# Patient Record
(demographics unavailable — no encounter records)

---

## 2024-10-29 NOTE — HISTORY OF PRESENT ILLNESS
[FreeTextEntry1] : Follow up  [de-identified] : may kevin- psychiatrist  went off duloxetine due to issues with potential contaminents started lexapro but doesnt feel well on it she has follow up scheduled to discuss

## 2024-11-08 NOTE — PHYSICAL EXAM
[No Respiratory Distress] : no respiratory distress  [No Accessory Muscle Use] : no accessory muscle use [Normal] : affect was normal and insight and judgment were intact [de-identified] : scattered course breath sounds bilateral

## 2024-11-08 NOTE — HISTORY OF PRESENT ILLNESS
[FreeTextEntry8] : 72 yo female presents with complaint of cough, she says it feels productive, reports congestion/headache. Symptoms began a week ago. Denies fever, chills, cp, palpitations, sob, nvcd.

## 2024-11-11 NOTE — ASSESSMENT
[FreeTextEntry1] : Acute bronchitis: start doxycycline 100 mg po bid x 7 days, start prednisone 10 mg po bid x 5 days, recommend supportive care, start tylenol prn for fever/pain, recommend increased hydration, call EMS if symptoms worsen or develop sob. Recommend hand hygiene, cover mouth when coughing, wash hands frequently, f/u covid19/viral panel ordered RTC 2 wks
former smoker

## 2024-12-07 NOTE — PLAN
[FreeTextEntry1] : Pt is here for an annual PE.   OBGYN: Pt as given referral to Dr. Janet JAEGER. Pt was given a referral for mammogram because she is post-due.   rheumatology: Pt denies taking Prolia short since the past two years. Pt was given referral to Dr. Hernandes for monitoring bone density health and Dexa Scan (May 2023).   GI pt had an endoscopy. Everything was normal. Pt takes acid reflux medicine to manage sxs. Pt was recommended to follow up with Dr. Acevedo   Pt was given referral to dermatology for cyst on her forehead. Pt was given referral to dr. Pamela Vega.  Pt is active physically and exercise daily. Recommended dietary changes including 90-100g of protein, aerobic exercise and emphasized strength training regimen at least 2x weekly to maintain healthy lean muscle mass and bone density.  reviewed labs: Pt encouraged to take vitamin D3 5,000 daily.  Pt has elevated HLD. Pt was recommended to follow up with cardiologist to monitor sxs.     Lab monitoring drawn in office today w/ CBC, CMP, ESR, CRP. Lab monitoring drawn in office today for high risk medication use, high risk disease monitoring.  Pt is UTD with flu vaccines. Pt counseled on B12 vaccine. I recommend she receives it w/ no reservations. Pt needs a second pneumonia vaccine. I recommend she receives it w/ no reservations. Vaccines were administered safely.   RTO in 6-12 months for f.u.

## 2024-12-07 NOTE — HEALTH RISK ASSESSMENT
[Good] : ~his/her~  mood as  good [No] : No [1 or 2 (0 pts)] : 1 or 2 (0 points) [Never (0 pts)] : Never (0 points) [No falls in past year] : Patient reported no falls in the past year [0] : 2) Feeling down, depressed, or hopeless: Not at all (0) [PHQ-2 Negative - No further assessment needed] : PHQ-2 Negative - No further assessment needed [Audit-CScore] : 0 [[id number]] : 0 [MammogramDate] : 12/2019 [BoneDensityDate] : 05/2023 [ColonoscopyDate] : 07/2007

## 2024-12-07 NOTE — HISTORY OF PRESENT ILLNESS
[de-identified] : 73 year y.o female pt is here today for an annual physical examination. Pt is in a good mood. Pt is currently following up with a cardiologist, OBGYN, endocrinologist. Pt was given referral to dermatologist GI, and OBGYN. Pt is no longer using a walker after her bariatric surgery and remarkable improvements in health. Pt was given a referral for mammogram. Pt sxs are currently stable. No active complaints today. UTD with immunization. Pt received a flu shot in the office today.

## 2024-12-07 NOTE — HISTORY OF PRESENT ILLNESS
[de-identified] : 73 year y.o female pt is here today for an annual physical examination. Pt is in a good mood. Pt is currently following up with a cardiologist, OBGYN, endocrinologist. Pt was given referral to dermatologist GI, and OBGYN. Pt is no longer using a walker after her bariatric surgery and remarkable improvements in health. Pt was given a referral for mammogram. Pt sxs are currently stable. No active complaints today. UTD with immunization. Pt received a flu shot in the office today.

## 2024-12-07 NOTE — REASON FOR VISIT
[Annual Wellness Visit] : an annual wellness visit [FreeTextEntry1] : [facility] wellness exam

## 2024-12-07 NOTE — ADDENDUM
[FreeTextEntry1] :    IMerlinghar wrote this note acting as a scribe for Dr. Kristel Muñoz MD on Dec 02, 2024 .   I, Dr. Kristel Muñoz MD, ordering physician, have read and attest that all the information, medical decision making and discharge instructions within are true and accurate on 12/02/2024.

## 2024-12-07 NOTE — HEALTH RISK ASSESSMENT
[Good] : ~his/her~  mood as  good [No] : No [1 or 2 (0 pts)] : 1 or 2 (0 points) [Never (0 pts)] : Never (0 points) [No falls in past year] : Patient reported no falls in the past year [0] : 2) Feeling down, depressed, or hopeless: Not at all (0) [PHQ-2 Negative - No further assessment needed] : PHQ-2 Negative - No further assessment needed [Audit-CScore] : 0 [OYP4Eltss] : 0 [MammogramDate] : 12/2019 [BoneDensityDate] : 05/2023 [ColonoscopyDate] : 07/2007

## 2024-12-07 NOTE — PLAN
Statement Selected [FreeTextEntry1] : Pt is here for an annual PE.   OBGYN: Pt as given referral to Dr. Janet JAEGER. Pt was given a referral for mammogram because she is post-due.   rheumatology: Pt denies taking Prolia short since the past two years. Pt was given referral to Dr. Hernandes for monitoring bone density health and Dexa Scan (May 2023).   GI pt had an endoscopy. Everything was normal. Pt takes acid reflux medicine to manage sxs. Pt was recommended to follow up with Dr. Acevedo   Pt was given referral to dermatology for cyst on her forehead. Pt was given referral to dr. Pamela Vega.  Pt is active physically and exercise daily. Recommended dietary changes including 90-100g of protein, aerobic exercise and emphasized strength training regimen at least 2x weekly to maintain healthy lean muscle mass and bone density.  reviewed labs: Pt encouraged to take vitamin D3 5,000 daily.  Pt has elevated HLD. Pt was recommended to follow up with cardiologist to monitor sxs.     Lab monitoring drawn in office today w/ CBC, CMP, ESR, CRP. Lab monitoring drawn in office today for high risk medication use, high risk disease monitoring.  Pt is UTD with flu vaccines. Pt counseled on B12 vaccine. I recommend she receives it w/ no reservations. Pt needs a second pneumonia vaccine. I recommend she receives it w/ no reservations. Vaccines were administered safely.   RTO in 6-12 months for f.u.

## 2024-12-07 NOTE — REASON FOR VISIT
[Annual Wellness Visit] : an annual wellness visit [FreeTextEntry1] : [facility] wellness exam

## 2025-03-03 NOTE — PHYSICAL EXAM
[Alert] : alert [Well Nourished] : well nourished [Obese] : obese [No Acute Distress] : no acute distress [Well Developed] : well developed [Normal Sclera/Conjunctiva] : normal sclera/conjunctiva [EOMI] : extra ocular movement intact [No Proptosis] : no proptosis [Well Healed Scar] : well healed scar [No Respiratory Distress] : no respiratory distress [No Accessory Muscle Use] : no accessory muscle use [Clear to Auscultation] : lungs were clear to auscultation bilaterally [Normal S1, S2] : normal S1 and S2 [Normal Rate] : heart rate was normal [Regular Rhythm] : with a regular rhythm [Normal Anterior Cervical Nodes] : no anterior cervical lymphadenopathy [No Tremors] : no tremors [Oriented x3] : oriented to person, place, and time [Normal Affect] : the affect was normal [Normal Mood] : the mood was normal [Acanthosis Nigricans] : no acanthosis nigricans [de-identified] : mild B/L LE edema, L > R
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
## 2025-03-03 NOTE — PHYSICAL EXAM
[Alert] : alert [Well Nourished] : well nourished [Obese] : obese [No Acute Distress] : no acute distress [Well Developed] : well developed [Normal Sclera/Conjunctiva] : normal sclera/conjunctiva [EOMI] : extra ocular movement intact [No Proptosis] : no proptosis [Well Healed Scar] : well healed scar [No Respiratory Distress] : no respiratory distress [No Accessory Muscle Use] : no accessory muscle use [Clear to Auscultation] : lungs were clear to auscultation bilaterally [Normal S1, S2] : normal S1 and S2 [Normal Rate] : heart rate was normal [Regular Rhythm] : with a regular rhythm [Normal Anterior Cervical Nodes] : no anterior cervical lymphadenopathy [No Tremors] : no tremors [Oriented x3] : oriented to person, place, and time [Normal Affect] : the affect was normal [Normal Mood] : the mood was normal [Acanthosis Nigricans] : no acanthosis nigricans [de-identified] : mild B/L LE edema, L > R
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
## 2025-03-10 NOTE — PHYSICAL EXAM
[Delayed in the Right Toes] : capillary refills normal in right toes [Delayed in the Left Toes] : capillary refills normal in the left toes [1+] : left foot dorsalis pedis 1+ [TextEntry] : The toenails were thick and discolored on the toes of both feet 1 - 5.  Mild subungual debris was noted on multiple toes as well as incurvation of the toenails.  Upon palpation pain was elicited.  There did not appear to be bacterial infection noted.  The pedal pulses and neurological parameters were unchanged and no other dermatological changes were noted on either foot.  There is pain and hyperkeratosis of the distal aspects of both third toes.  There is retrograde pressure in the third MPJs.  Ecchymosis is absent.  No signs of infection are noted.

## 2025-03-10 NOTE — ASSESSMENT
[FreeTextEntry1] : Assessment: Onychomycosis caused by T. Rubrum. Hammertoe deformities, third toes bilaterally.  Plan: I debrided each toenail via mechanical trimming and electrical grinding.  All toenails were trimmed with a 14 cm sterile stainless steel box lock double spring nail splitter.  Then utilizing a sterile pear shaped carmina brad (this device falls under bur, surgical, general & plastic surgery.  The FDA deems this item a Class-1 device) via a 35,000 RPM electric drill and vacuum and dust extractor system all toenails were aseptically debrided removing fungal layers.  This is done to diminish the fungal load of the toenails and enhance the effects of the antifungal medication, allowing overall improvement in the degree of fungal infection as well as improve appearance and reduce discomfort and help diminish chances of secondary bacterial infection, also lessening the chance of ingrown nails, especially when performed on a regular basis.  The patient was encouraged to continue with the topical antifungal medication everyday and use the Clean Sweep antifungal spray to disinfect their shoes. She was encouraged to call the office with any questions or problems as they may arise.  DP, medial, oblique, and lateral weightbearing radiographs are taken of both feet and reveal no fractures or dislocations.  I sharply debrided the painful hyperkeratotic lesions on both third toes to the patient's tolerance.  She is not a candidate for NSAIDs due to gastric bypass surgery and her upcoming knee surgery.  She will follow up here after her knee surgery.  PTR: 2 months.
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
## 2025-06-17 NOTE — PHYSICAL EXAM
[Alert] : alert [Well Nourished] : well nourished [Obese] : obese [No Acute Distress] : no acute distress [Well Developed] : well developed [Normal Sclera/Conjunctiva] : normal sclera/conjunctiva [EOMI] : extra ocular movement intact [No Proptosis] : no proptosis [Well Healed Scar] : well healed scar [No Respiratory Distress] : no respiratory distress [No Accessory Muscle Use] : no accessory muscle use [Clear to Auscultation] : lungs were clear to auscultation bilaterally [Normal S1, S2] : normal S1 and S2 [Normal Rate] : heart rate was normal [Regular Rhythm] : with a regular rhythm [Normal Anterior Cervical Nodes] : no anterior cervical lymphadenopathy [No Tremors] : no tremors [Oriented x3] : oriented to person, place, and time [Normal Affect] : the affect was normal [Normal Mood] : the mood was normal [Acanthosis Nigricans] : no acanthosis nigricans [de-identified] : mild B/L LE edema, L > R

## 2025-06-17 NOTE — PHYSICAL EXAM
[Alert] : alert [Well Nourished] : well nourished [Obese] : obese [No Acute Distress] : no acute distress [Well Developed] : well developed [Normal Sclera/Conjunctiva] : normal sclera/conjunctiva [EOMI] : extra ocular movement intact [No Proptosis] : no proptosis [Well Healed Scar] : well healed scar [No Respiratory Distress] : no respiratory distress [No Accessory Muscle Use] : no accessory muscle use [Clear to Auscultation] : lungs were clear to auscultation bilaterally [Normal S1, S2] : normal S1 and S2 [Normal Rate] : heart rate was normal [Regular Rhythm] : with a regular rhythm [Normal Anterior Cervical Nodes] : no anterior cervical lymphadenopathy [No Tremors] : no tremors [Oriented x3] : oriented to person, place, and time [Normal Affect] : the affect was normal [Normal Mood] : the mood was normal [Acanthosis Nigricans] : no acanthosis nigricans [de-identified] : mild B/L LE edema, L > R

## 2025-06-17 NOTE — REVIEW OF SYSTEMS
[Polyuria] : polyuria [Nocturia] : nocturia [Polydipsia] : polydipsia [Chest Pain] : no chest pain [Palpitations] : no palpitations [Shortness Of Breath] : no shortness of breath [Nausea] : no nausea [Abdominal Pain] : no abdominal pain [Vomiting] : no vomiting [Pain/Numbness of Digits] : no pain/numbness of digits
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
## 2025-06-17 NOTE — PHYSICAL EXAM
[Alert] : alert [Well Nourished] : well nourished [Obese] : obese [No Acute Distress] : no acute distress [Well Developed] : well developed [Normal Sclera/Conjunctiva] : normal sclera/conjunctiva [EOMI] : extra ocular movement intact [No Proptosis] : no proptosis [Well Healed Scar] : well healed scar [No Respiratory Distress] : no respiratory distress [No Accessory Muscle Use] : no accessory muscle use [Clear to Auscultation] : lungs were clear to auscultation bilaterally [Normal S1, S2] : normal S1 and S2 [Normal Rate] : heart rate was normal [Regular Rhythm] : with a regular rhythm [Normal Anterior Cervical Nodes] : no anterior cervical lymphadenopathy [No Tremors] : no tremors [Oriented x3] : oriented to person, place, and time [Normal Affect] : the affect was normal [Normal Mood] : the mood was normal [Acanthosis Nigricans] : no acanthosis nigricans [de-identified] : mild B/L LE edema, L > R
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
